# Patient Record
Sex: MALE | Race: BLACK OR AFRICAN AMERICAN | NOT HISPANIC OR LATINO | URBAN - METROPOLITAN AREA
[De-identification: names, ages, dates, MRNs, and addresses within clinical notes are randomized per-mention and may not be internally consistent; named-entity substitution may affect disease eponyms.]

---

## 2020-07-25 ENCOUNTER — TELEPHONE ENCOUNTER (OUTPATIENT)
Dept: URBAN - METROPOLITAN AREA CLINIC 13 | Facility: CLINIC | Age: 80
End: 2020-07-25

## 2020-07-25 RX ORDER — OXYCODONE HYDROCHLORIDE AND ACETAMINOPHEN 10; 325 MG/1; MG/1
TAKE 1 TABLET EVERY 8 HOURS AS NEEDED FOR PAIN TABLET ORAL
Refills: 0 | OUTPATIENT
Start: 2017-08-15 | End: 2018-04-23

## 2020-07-25 RX ORDER — DEXTROSE 4 G
TAKE 1 TABLET DAILY TABLET,CHEWABLE ORAL
Refills: 0 | OUTPATIENT
Start: 2017-08-15 | End: 2018-04-23

## 2020-07-25 RX ORDER — MORPHINE SULFATE 15 MG
TAKE 1 TABLET TWICE DAILY TABLET ORAL
Refills: 0 | OUTPATIENT
Start: 2017-08-15 | End: 2018-04-23

## 2020-07-25 RX ORDER — ALBUTEROL SULFATE 90 UG/1
INHALE 1 TO 2 PUFFS EVERY 4 TO 6 HOURS AS NEEDED AEROSOL, METERED RESPIRATORY (INHALATION)
Refills: 0 | OUTPATIENT
Start: 2017-08-15 | End: 2018-04-23

## 2020-07-25 RX ORDER — BUTALBITAL, ACETAMINOPHEN, CAFFEINE, AND CODEINE PHOSPHATE 30; 50; 40; 325 MG/1; MG/1; MG/1; MG/1
TAKE 1 CAPSULE TWICE DAILY CAPSULE ORAL
Refills: 0 | OUTPATIENT
Start: 2017-08-15 | End: 2018-04-23

## 2020-07-25 RX ORDER — POLYETHYLENE GLYCOL 3350, SODIUM CHLORIDE, SODIUM BICARBONATE AND POTASSIUM CHLORIDE WITH LEMON FLAVOR 420; 11.2; 5.72; 1.48 G/4L; G/4L; G/4L; G/4L
TAKE 1/2 GALLON AT 5:00 PM DAY BEFORE PROCEDURE, TAKE SECOND 1/2 OF GALLON 6 HRS PRIOR TO PROCEDURE POWDER, FOR SOLUTION ORAL
Qty: 1 | Refills: 0 | OUTPATIENT
Start: 2018-04-23 | End: 2018-04-30

## 2020-07-25 RX ORDER — HYDROCHLOROTHIAZIDE 25 MG/1
TAKE 1 TABLET DAILY TABLET ORAL
Refills: 0 | OUTPATIENT
Start: 2017-08-15 | End: 2018-04-23

## 2020-07-25 RX ORDER — FLUTICASONE FUROATE AND VILANTEROL TRIFENATATE 200; 25 UG/1; UG/1
INHALE 1 PUFFS DAILY POWDER RESPIRATORY (INHALATION)
Refills: 0 | OUTPATIENT
Start: 2017-08-15 | End: 2018-04-23

## 2020-07-25 RX ORDER — POLYETHYLENE GLYCOL 3350, SODIUM SULFATE, SODIUM CHLORIDE, POTASSIUM CHLORIDE, ASCORBIC ACID, SODIUM ASCORBATE 7.5-2.691G
DRINK 1 LITER OF SOLUTION AT 5:00 PM THE DAY BEFORE PROCEDURE; DRINK 1 LITER OF SOLUTION 6 HOURS PRIOR TO PROCEDURE KIT ORAL
Qty: 2000 | Refills: 0 | OUTPATIENT
Start: 2019-03-01 | End: 2019-04-16

## 2020-07-25 RX ORDER — METFORMIN HYDROCHLORIDE 500 MG/1
TAKE 1 TABLET EVERY 12 HOURS WITH FOOD TABLET, COATED ORAL
Qty: 60 | Refills: 0 | OUTPATIENT
Start: 2018-10-22 | End: 2020-02-06

## 2020-07-25 RX ORDER — CYCLOBENZAPRINE HYDROCHLORIDE 15 MG/1
TAKE 1 CAPSULE ONCE DAILY CAPSULE, EXTENDED RELEASE ORAL
Refills: 0 | OUTPATIENT
Start: 2017-08-15 | End: 2018-04-23

## 2020-07-25 RX ORDER — POLYETHYLENE GLYCOL 3350, SODIUM CHLORIDE, SODIUM BICARBONATE AND POTASSIUM CHLORIDE WITH LEMON FLAVOR 420; 11.2; 5.72; 1.48 G/4L; G/4L; G/4L; G/4L
TAKE 1/2 GALLON AT 5:00 PM DAY BEFORE PROCEDURE, TAKE SECOND 1/2 OF GALLON 6 HRS PRIOR TO PROCEDURE POWDER, FOR SOLUTION ORAL
Qty: 1 | Refills: 0 | OUTPATIENT
Start: 2017-08-15 | End: 2017-08-25

## 2020-07-25 RX ORDER — ATENOLOL 100 MG/1
TAKE 1 TABLET DAILY TABLET ORAL
Refills: 0 | OUTPATIENT
Start: 2017-08-15 | End: 2018-04-23

## 2020-07-25 RX ORDER — FUROSEMIDE 20 MG/1
TAKE 1 TABLET TWICE DAILY TABLET ORAL
Refills: 0 | OUTPATIENT
Start: 2017-08-15 | End: 2018-04-23

## 2020-07-26 ENCOUNTER — TELEPHONE ENCOUNTER (OUTPATIENT)
Dept: URBAN - METROPOLITAN AREA CLINIC 13 | Facility: CLINIC | Age: 80
End: 2020-07-26

## 2020-07-26 RX ORDER — DONEPEZIL HYDROCHLORIDE 10 MG/1
TABLET, FILM COATED ORAL
Qty: 30 | Refills: 0 | Status: ACTIVE | COMMUNITY
Start: 2018-11-09

## 2020-07-26 RX ORDER — LATANOPROST/PF 0.005 %
DROPS OPHTHALMIC (EYE)
Qty: 8 | Refills: 0 | Status: ACTIVE | COMMUNITY
Start: 2018-09-13

## 2020-07-26 RX ORDER — DONEPEZIL HYDROCHLORIDE 10 MG/1
TAKE 1 TABLET DAILY WITH FOOD TABLET, FILM COATED ORAL
Refills: 0 | Status: ACTIVE | COMMUNITY
Start: 2019-02-11

## 2020-07-26 RX ORDER — CIPROFLOXACIN HYDROCHLORIDE 500 MG/1
TABLET, FILM COATED ORAL
Qty: 20 | Refills: 0 | Status: ACTIVE | COMMUNITY
Start: 2019-03-08

## 2020-07-26 RX ORDER — DORZOLAMIDE/TIMOLOL/PF 2 %-0.5 %
DROPS OPHTHALMIC (EYE)
Qty: 10 | Refills: 0 | Status: ACTIVE | COMMUNITY
Start: 2019-10-15

## 2020-07-26 RX ORDER — DICLOFENAC SODIUM 20 MG/G
SOLUTION TOPICAL
Qty: 112 | Refills: 0 | Status: ACTIVE | COMMUNITY
Start: 2018-01-12

## 2020-07-26 RX ORDER — ROSUVASTATIN CALCIUM 10 MG
TAKE 1 TABLET DAILY TABLET ORAL
Refills: 0 | Status: ACTIVE | COMMUNITY

## 2020-07-26 RX ORDER — DEXTROSE 4 G
TAKE 1 TABLET AT BEDTIME TABLET,CHEWABLE ORAL
Refills: 0 | Status: ACTIVE | COMMUNITY

## 2020-07-26 RX ORDER — CHLORHEXIDINE GLUCONATE 4 %
TAKE 1 TABLET DAILY AS DIRECTED LIQUID (ML) TOPICAL
Refills: 0 | Status: ACTIVE | COMMUNITY

## 2020-07-26 RX ORDER — DORZOLAMIDE HCL/PF 2 %
DROPS OPHTHALMIC (EYE)
Qty: 10 | Refills: 0 | Status: ACTIVE | COMMUNITY
Start: 2018-09-13

## 2020-07-26 RX ORDER — LATANOPROST/PF 0.005 %
INSTILL 1 DROP  INTO AFFECTED EYE(S) ONCE DAILY AS DIRECTED DROPS OPHTHALMIC (EYE)
Refills: 0 | Status: ACTIVE | COMMUNITY
Start: 2019-02-25

## 2020-07-26 RX ORDER — LATANOPROST/PF 0.005 %
DROPS OPHTHALMIC (EYE)
Qty: 8 | Refills: 0 | Status: ACTIVE | COMMUNITY
Start: 2018-05-18

## 2020-07-26 RX ORDER — HYDROCODONE BITARTRATE AND ACETAMINOPHEN 5; 325 MG/1; MG/1
TABLET ORAL
Qty: 30 | Refills: 0 | Status: ACTIVE | COMMUNITY
Start: 2018-02-19

## 2020-07-26 RX ORDER — MEGESTROL ACETATE 20 MG/1
TABLET ORAL
Qty: 20 | Refills: 0 | Status: ACTIVE | COMMUNITY
Start: 2018-03-01

## 2020-07-26 RX ORDER — BLOOD SUGAR DIAGNOSTIC
STRIP MISCELLANEOUS
Qty: 100 | Refills: 0 | Status: ACTIVE | COMMUNITY
Start: 2018-03-29

## 2020-07-26 RX ORDER — METFORMIN HYDROCHLORIDE 500 MG/1
TABLET, COATED ORAL
Qty: 90 | Refills: 0 | Status: ACTIVE | COMMUNITY
Start: 2018-10-22

## 2020-07-26 RX ORDER — MEMANTINE HYDROCHLORIDE 5 MG/1
TABLET ORAL
Qty: 60 | Refills: 0 | Status: ACTIVE | COMMUNITY
Start: 2018-04-27

## 2020-07-26 RX ORDER — DONEPEZIL HYDROCHLORIDE 10 MG/1
TABLET, FILM COATED ORAL
Qty: 30 | Refills: 0 | Status: ACTIVE | COMMUNITY
Start: 2018-12-22

## 2020-07-26 RX ORDER — DICLOFENAC EPOLAMINE 0.01 G/1
SYSTEM TOPICAL
Qty: 60 | Refills: 0 | Status: ACTIVE | COMMUNITY
Start: 2018-10-23

## 2020-07-26 RX ORDER — METFORMIN HYDROCHLORIDE 500 MG/1
TABLET, COATED ORAL
Qty: 90 | Refills: 0 | Status: ACTIVE | COMMUNITY
Start: 2018-01-28

## 2020-07-26 RX ORDER — DORZOLAMIDE HCL/PF 2 %
DROPS OPHTHALMIC (EYE)
Qty: 20 | Refills: 0 | Status: ACTIVE | COMMUNITY
Start: 2018-05-21

## 2020-07-26 RX ORDER — CYPROHEPTADINE HYDROCHLORIDE 4 MG/1
TABLET ORAL
Qty: 30 | Refills: 0 | Status: ACTIVE | COMMUNITY
Start: 2018-03-14

## 2020-07-26 RX ORDER — DONEPEZIL HYDROCHLORIDE 5 MG/1
TABLET, FILM COATED ORAL
Qty: 30 | Refills: 0 | Status: ACTIVE | COMMUNITY
Start: 2018-10-27

## 2020-07-26 RX ORDER — LISINOPRIL 5 MG/1
TABLET ORAL
Qty: 90 | Refills: 0 | Status: ACTIVE | COMMUNITY
Start: 2018-03-04

## 2020-07-26 RX ORDER — FLUOXETINE HCL 20 MG
TAKE 1 CAPSULE DAILY CAPSULE ORAL
Refills: 0 | Status: ACTIVE | COMMUNITY

## 2020-07-26 RX ORDER — VARENICLINE TARTRATE 1 MG/1
TABLET, FILM COATED ORAL
Qty: 30 | Refills: 0 | Status: ACTIVE | COMMUNITY
Start: 2018-01-29

## 2020-07-26 RX ORDER — NALOXONE HYDROCHLORIDE 4 MG/.1ML
SPRAY NASAL
Qty: 2 | Refills: 0 | Status: ACTIVE | COMMUNITY
Start: 2018-09-21

## 2020-07-26 RX ORDER — MEGESTROL ACETATE 40 MG/1
TABLET ORAL
Qty: 30 | Refills: 0 | Status: ACTIVE | COMMUNITY
Start: 2018-03-19

## 2020-07-26 RX ORDER — MEMANTINE HYDROCHLORIDE 10 MG/1
TAKE 1 TABLET DAILY TABLET ORAL
Refills: 0 | Status: ACTIVE | COMMUNITY

## 2021-11-02 ENCOUNTER — TELEPHONE ENCOUNTER (OUTPATIENT)
Dept: URBAN - METROPOLITAN AREA CLINIC 113 | Facility: CLINIC | Age: 81
End: 2021-11-02

## 2021-11-04 ENCOUNTER — DASHBOARD ENCOUNTERS (OUTPATIENT)
Age: 81
End: 2021-11-04

## 2021-11-04 ENCOUNTER — OFFICE VISIT (OUTPATIENT)
Dept: URBAN - METROPOLITAN AREA CLINIC 113 | Facility: CLINIC | Age: 81
End: 2021-11-04
Payer: MEDICARE

## 2021-11-04 ENCOUNTER — WEB ENCOUNTER (OUTPATIENT)
Dept: URBAN - METROPOLITAN AREA CLINIC 113 | Facility: CLINIC | Age: 81
End: 2021-11-04

## 2021-11-04 VITALS
TEMPERATURE: 97.1 F | RESPIRATION RATE: 20 BRPM | HEART RATE: 72 BPM | SYSTOLIC BLOOD PRESSURE: 149 MMHG | BODY MASS INDEX: 29.18 KG/M2 | WEIGHT: 197 LBS | HEIGHT: 69 IN | DIASTOLIC BLOOD PRESSURE: 79 MMHG

## 2021-11-04 DIAGNOSIS — Z85.038 HISTORY OF COLON CANCER: ICD-10-CM

## 2021-11-04 PROCEDURE — 99213 OFFICE O/P EST LOW 20 MIN: CPT | Performed by: NURSE PRACTITIONER

## 2021-11-04 RX ORDER — MEGESTROL ACETATE 40 MG/1
TABLET ORAL
Qty: 30 | Refills: 0 | Status: ON HOLD | COMMUNITY
Start: 2018-03-19

## 2021-11-04 RX ORDER — MEMANTINE HYDROCHLORIDE 5 MG/1
TABLET ORAL
Qty: 60 | Refills: 0 | Status: ON HOLD | COMMUNITY
Start: 2018-04-27

## 2021-11-04 RX ORDER — DICLOFENAC EPOLAMINE 0.01 G/1
SYSTEM TOPICAL
Qty: 60 | Refills: 0 | Status: ON HOLD | COMMUNITY
Start: 2018-10-23

## 2021-11-04 RX ORDER — DICLOFENAC SODIUM 20 MG/G
SOLUTION TOPICAL
Qty: 112 | Refills: 0 | Status: ON HOLD | COMMUNITY
Start: 2018-01-12

## 2021-11-04 RX ORDER — BLOOD SUGAR DIAGNOSTIC
STRIP MISCELLANEOUS
Qty: 100 | Refills: 0 | Status: ON HOLD | COMMUNITY
Start: 2018-03-29

## 2021-11-04 RX ORDER — SODIUM, POTASSIUM,MAG SULFATES 17.5-3.13G
354 ML SOLUTION, RECONSTITUTED, ORAL ORAL ONCE
Qty: 354 MILLILITER | Refills: 0 | OUTPATIENT
Start: 2021-11-04 | End: 2021-11-05

## 2021-11-04 RX ORDER — VARENICLINE TARTRATE 1 MG/1
TABLET, FILM COATED ORAL
Qty: 30 | Refills: 0 | Status: ON HOLD | COMMUNITY
Start: 2018-01-29

## 2021-11-04 RX ORDER — ASPIRIN 81 MG/1
1 TABLET TABLET, COATED ORAL ONCE A DAY
Status: ACTIVE | COMMUNITY

## 2021-11-04 RX ORDER — MORPHINE SULFATE 15 MG/1
1 TABLET AS NEEDED TABLET ORAL
Status: ACTIVE | COMMUNITY

## 2021-11-04 RX ORDER — LISINOPRIL 5 MG/1
TABLET ORAL
Qty: 90 | Refills: 0 | Status: ON HOLD | COMMUNITY
Start: 2018-03-04

## 2021-11-04 RX ORDER — BUTALBITAL, ACETAMINOPHEN, AND CAFFEINE 50; 300; 40 MG/1; MG/1; MG/1
1 CAPSULE AS NEEDED CAPSULE ORAL
Status: ACTIVE | COMMUNITY

## 2021-11-04 RX ORDER — METFORMIN HYDROCHLORIDE 500 MG/1
TABLET, COATED ORAL
Qty: 90 | Refills: 0 | Status: ON HOLD | COMMUNITY
Start: 2018-01-28

## 2021-11-04 RX ORDER — HYDROCODONE BITARTRATE AND ACETAMINOPHEN 5; 325 MG/1; MG/1
TABLET ORAL
Qty: 30 | Refills: 0 | Status: ON HOLD | COMMUNITY
Start: 2018-02-19

## 2021-11-04 RX ORDER — DONEPEZIL HYDROCHLORIDE 10 MG/1
1 TABLET AT BEDTIME TABLET, FILM COATED ORAL ONCE A DAY
Refills: 0 | Status: ACTIVE | COMMUNITY
Start: 2018-10-27

## 2021-11-04 RX ORDER — DORZOLAMIDE HCL/PF 2 %
DROPS OPHTHALMIC (EYE)
Qty: 20 | Refills: 0 | Status: ON HOLD | COMMUNITY
Start: 2018-05-21

## 2021-11-04 RX ORDER — CYPROHEPTADINE HYDROCHLORIDE 4 MG/1
TABLET ORAL
Qty: 30 | Refills: 0 | Status: ON HOLD | COMMUNITY
Start: 2018-03-14

## 2021-11-04 RX ORDER — ROSUVASTATIN CALCIUM 10 MG
TAKE 1 TABLET DAILY TABLET ORAL
Refills: 0 | Status: ACTIVE | COMMUNITY

## 2021-11-04 RX ORDER — NALOXONE HYDROCHLORIDE 4 MG/.1ML
SPRAY NASAL
Qty: 2 | Refills: 0 | Status: ON HOLD | COMMUNITY
Start: 2018-09-21

## 2021-11-04 RX ORDER — MEGESTROL ACETATE 20 MG/1
TABLET ORAL
Qty: 20 | Refills: 0 | Status: ON HOLD | COMMUNITY
Start: 2018-03-01

## 2021-11-04 RX ORDER — CHLORHEXIDINE GLUCONATE 4 %
TAKE 1 TABLET DAILY AS DIRECTED LIQUID (ML) TOPICAL
Refills: 0 | Status: ACTIVE | COMMUNITY

## 2021-11-04 RX ORDER — DONEPEZIL HYDROCHLORIDE 10 MG/1
TABLET, FILM COATED ORAL
Qty: 30 | Refills: 0 | Status: ON HOLD | COMMUNITY
Start: 2018-11-09

## 2021-11-04 RX ORDER — DORZOLAMIDE/TIMOLOL/PF 2 %-0.5 %
DROPS OPHTHALMIC (EYE)
Qty: 10 | Refills: 0 | Status: ON HOLD | COMMUNITY
Start: 2019-10-15

## 2021-11-04 RX ORDER — MEMANTINE HYDROCHLORIDE 10 MG/1
TAKE 1 TABLET DAILY TABLET ORAL TWICE A DAY
Refills: 0 | Status: ACTIVE | COMMUNITY

## 2021-11-04 RX ORDER — LATANOPROST/PF 0.005 %
DROPS OPHTHALMIC (EYE)
Qty: 8 | Refills: 0 | Status: ON HOLD | COMMUNITY
Start: 2018-05-18

## 2021-11-04 RX ORDER — DEXTROSE 4 G
TAKE 1 TABLET AT BEDTIME TABLET,CHEWABLE ORAL
Refills: 0 | Status: ACTIVE | COMMUNITY

## 2021-11-04 RX ORDER — CIPROFLOXACIN HYDROCHLORIDE 500 MG/1
TABLET, FILM COATED ORAL
Qty: 20 | Refills: 0 | Status: ON HOLD | COMMUNITY
Start: 2019-03-08

## 2021-11-04 RX ORDER — FLUOXETINE HCL 20 MG
TAKE 1 CAPSULE DAILY CAPSULE ORAL
Refills: 0 | Status: ON HOLD | COMMUNITY

## 2021-11-04 RX ORDER — ATENOLOL 100 MG/1
1 TABLET TABLET ORAL ONCE A DAY
Status: ACTIVE | COMMUNITY

## 2021-11-04 NOTE — HPI-TODAY'S VISIT:
Mr. Shah is a 80-year-old male with a history of colon cancer s/p resection, presenting to discuss colonoscopy.  His last colonoscopy in 2019 was negative for recurrence. Repeat colonoscopy was recommended in April 2021, with plan for annual labs (CBC, CMP, CEA) with his PCP. He is asymptomatic from a GI standpoint, denying abdominal pain, nausea, vomiting, change in bowel habits, or blood per rectum.

## 2021-11-04 NOTE — HPI-OTHER HISTORIES
Screening Colonoscopy 8/25/17 which revealed a 15 mm polyp in the proximal transverse colon which did not lift suggesting submucosal invasion, polypectomy was not attempted and the area was tattooed. There were 3 other sessile polyps ranging in size from 2-4 mm resected from the transverse colon, ascending colon, many diverticula throughout the entire colon, normal terminal ileum, medium size internal hemorrhoids and otherwise normal colon. Biopsies from the transverse colon polyp were tubulovillous adenoma and the other polyps were tubular adenomas. He was referred to [facility]. Repeat colonoscopy by Dr. Dockery November 14 revealed a 15 mm sessile polyp in the distal ascending colon status post mucosal resection, the polyp did not lift well so piecemeal resection was performed and a large portion was ablated with argon plasma coagulation, 2 tattoos were placed distal to the polyp, 2 polyps measuring 3 mm were found in the cecum which were removed with cold biopsy, 6 mm ileocecal valve polyp was removed with a cold snare and diverticulosis was also present. Pathology from the EMR revealed tubular adenoma with high-grade dysplasia suspicious for invasion, the other polyps were tubular adenomas. He was referred to surgery and underwent [place] [facility] [place] Pathology from the hemicolectomy revealed 10 benign lymph nodes and no obvious cancer or any notable ascending colon polyp or tattoo site. Repeat colonoscopy in 4/16/19 revealed a normal ileocolonic anastomosis in the proximal transverse colon, a tattoo was seen in the mid transverse colon with no evidence of residual cancer or polyp, many diverticula in the sigmoid and descending colon, medium sized internal hemorrhoids and otherwise normal colon.

## 2021-11-05 ENCOUNTER — TELEPHONE ENCOUNTER (OUTPATIENT)
Dept: URBAN - METROPOLITAN AREA CLINIC 23 | Facility: CLINIC | Age: 81
End: 2021-11-05

## 2021-11-08 PROBLEM — 429699009 HISTORY OF MALIGNANT NEOPLASM OF COLON: Status: ACTIVE | Noted: 2021-11-04

## 2021-11-11 ENCOUNTER — CLAIMS CREATED FROM THE CLAIM WINDOW (OUTPATIENT)
Dept: URBAN - METROPOLITAN AREA CLINIC 4 | Facility: CLINIC | Age: 81
End: 2021-11-11
Payer: MEDICARE

## 2021-11-11 ENCOUNTER — OFFICE VISIT (OUTPATIENT)
Dept: URBAN - METROPOLITAN AREA SURGERY CENTER 25 | Facility: SURGERY CENTER | Age: 81
End: 2021-11-11
Payer: MEDICARE

## 2021-11-11 DIAGNOSIS — Z85.038 H/O COLON CANCER, STAGE I: ICD-10-CM

## 2021-11-11 DIAGNOSIS — D12.3 ADENOMA OF TRANSVERSE COLON: ICD-10-CM

## 2021-11-11 DIAGNOSIS — D12.3 BENIGN NEOPLASM OF TRANSVERSE COLON: ICD-10-CM

## 2021-11-11 LAB
A/G RATIO: 1.1
ALBUMIN: 4
ALKALINE PHOSPHATASE: 96
ALT (SGPT): 18
AST (SGOT): 26
BASO (ABSOLUTE): 0
BASOS: 1
BILIRUBIN, TOTAL: 0.7
BUN/CREATININE RATIO: 17
BUN: 19
CALCIUM: 9.3
CARBON DIOXIDE, TOTAL: 21
CEA: 3.3
CHLORIDE: 106
CREATININE: 1.15
EGFR IF AFRICN AM: 70
EGFR IF NONAFRICN AM: 61
EOS (ABSOLUTE): 0.1
EOS: 2
GLOBULIN, TOTAL: 3.5
GLUCOSE: 134
HEMATOCRIT: 37.3
HEMATOLOGY COMMENTS:: (no result)
HEMOGLOBIN: 12.1
IMMATURE CELLS: (no result)
IMMATURE GRANS (ABS): 0
IMMATURE GRANULOCYTES: 0
LYMPHS (ABSOLUTE): 2.6
LYMPHS: 40
MCH: 31.9
MCHC: 32.4
MCV: 98
MONOCYTES(ABSOLUTE): 1
MONOCYTES: 17
NEUTROPHILS (ABSOLUTE): 2.5
NEUTROPHILS: 40
NRBC: (no result)
PLATELETS: 219
POTASSIUM: 4.4
PROTEIN, TOTAL: 7.5
RBC: 3.79
RDW: 12.3
SODIUM: 141
WBC: 6.3

## 2021-11-11 PROCEDURE — 45385 COLONOSCOPY W/LESION REMOVAL: CPT | Performed by: INTERNAL MEDICINE

## 2021-11-11 PROCEDURE — 88305 TISSUE EXAM BY PATHOLOGIST: CPT | Performed by: PATHOLOGY

## 2021-11-11 PROCEDURE — G8907 PT DOC NO EVENTS ON DISCHARG: HCPCS | Performed by: INTERNAL MEDICINE

## 2021-11-11 RX ORDER — HYDROCODONE BITARTRATE AND ACETAMINOPHEN 5; 325 MG/1; MG/1
TABLET ORAL
Qty: 30 | Refills: 0 | Status: ON HOLD | COMMUNITY
Start: 2018-02-19

## 2021-11-11 RX ORDER — METFORMIN HYDROCHLORIDE 500 MG/1
TABLET, COATED ORAL
Qty: 90 | Refills: 0 | Status: ON HOLD | COMMUNITY
Start: 2018-01-28

## 2021-11-11 RX ORDER — ROSUVASTATIN CALCIUM 10 MG
TAKE 1 TABLET DAILY TABLET ORAL
Refills: 0 | Status: ACTIVE | COMMUNITY

## 2021-11-11 RX ORDER — BUTALBITAL, ACETAMINOPHEN, AND CAFFEINE 50; 300; 40 MG/1; MG/1; MG/1
1 CAPSULE AS NEEDED CAPSULE ORAL
Status: ACTIVE | COMMUNITY

## 2021-11-11 RX ORDER — LATANOPROST/PF 0.005 %
DROPS OPHTHALMIC (EYE)
Qty: 8 | Refills: 0 | Status: ON HOLD | COMMUNITY
Start: 2018-05-18

## 2021-11-11 RX ORDER — DORZOLAMIDE HCL/PF 2 %
DROPS OPHTHALMIC (EYE)
Qty: 20 | Refills: 0 | Status: ON HOLD | COMMUNITY
Start: 2018-05-21

## 2021-11-11 RX ORDER — MEMANTINE HYDROCHLORIDE 5 MG/1
TABLET ORAL
Qty: 60 | Refills: 0 | Status: ON HOLD | COMMUNITY
Start: 2018-04-27

## 2021-11-11 RX ORDER — ATENOLOL 100 MG/1
1 TABLET TABLET ORAL ONCE A DAY
Status: ACTIVE | COMMUNITY

## 2021-11-11 RX ORDER — DICLOFENAC EPOLAMINE 0.01 G/1
SYSTEM TOPICAL
Qty: 60 | Refills: 0 | Status: ON HOLD | COMMUNITY
Start: 2018-10-23

## 2021-11-11 RX ORDER — DONEPEZIL HYDROCHLORIDE 10 MG/1
1 TABLET AT BEDTIME TABLET, FILM COATED ORAL ONCE A DAY
Refills: 0 | Status: ACTIVE | COMMUNITY
Start: 2018-10-27

## 2021-11-11 RX ORDER — MORPHINE SULFATE 15 MG/1
1 TABLET AS NEEDED TABLET ORAL
Status: ACTIVE | COMMUNITY

## 2021-11-11 RX ORDER — NALOXONE HYDROCHLORIDE 4 MG/.1ML
SPRAY NASAL
Qty: 2 | Refills: 0 | Status: ON HOLD | COMMUNITY
Start: 2018-09-21

## 2021-11-11 RX ORDER — FLUOXETINE HCL 20 MG
TAKE 1 CAPSULE DAILY CAPSULE ORAL
Refills: 0 | Status: ON HOLD | COMMUNITY

## 2021-11-11 RX ORDER — CHLORHEXIDINE GLUCONATE 4 %
TAKE 1 TABLET DAILY AS DIRECTED LIQUID (ML) TOPICAL
Refills: 0 | Status: ACTIVE | COMMUNITY

## 2021-11-11 RX ORDER — BLOOD SUGAR DIAGNOSTIC
STRIP MISCELLANEOUS
Qty: 100 | Refills: 0 | Status: ON HOLD | COMMUNITY
Start: 2018-03-29

## 2021-11-11 RX ORDER — CYPROHEPTADINE HYDROCHLORIDE 4 MG/1
TABLET ORAL
Qty: 30 | Refills: 0 | Status: ON HOLD | COMMUNITY
Start: 2018-03-14

## 2021-11-11 RX ORDER — CIPROFLOXACIN HYDROCHLORIDE 500 MG/1
TABLET, FILM COATED ORAL
Qty: 20 | Refills: 0 | Status: ON HOLD | COMMUNITY
Start: 2019-03-08

## 2021-11-11 RX ORDER — ASPIRIN 81 MG/1
1 TABLET TABLET, COATED ORAL ONCE A DAY
Status: ACTIVE | COMMUNITY

## 2021-11-11 RX ORDER — DICLOFENAC SODIUM 20 MG/G
SOLUTION TOPICAL
Qty: 112 | Refills: 0 | Status: ON HOLD | COMMUNITY
Start: 2018-01-12

## 2021-11-11 RX ORDER — MEGESTROL ACETATE 20 MG/1
TABLET ORAL
Qty: 20 | Refills: 0 | Status: ON HOLD | COMMUNITY
Start: 2018-03-01

## 2021-11-11 RX ORDER — DORZOLAMIDE/TIMOLOL/PF 2 %-0.5 %
DROPS OPHTHALMIC (EYE)
Qty: 10 | Refills: 0 | Status: ON HOLD | COMMUNITY
Start: 2019-10-15

## 2021-11-11 RX ORDER — VARENICLINE TARTRATE 1 MG/1
TABLET, FILM COATED ORAL
Qty: 30 | Refills: 0 | Status: ON HOLD | COMMUNITY
Start: 2018-01-29

## 2021-11-11 RX ORDER — MEMANTINE HYDROCHLORIDE 10 MG/1
TAKE 1 TABLET DAILY TABLET ORAL TWICE A DAY
Refills: 0 | Status: ACTIVE | COMMUNITY

## 2021-11-11 RX ORDER — LISINOPRIL 5 MG/1
TABLET ORAL
Qty: 90 | Refills: 0 | Status: ON HOLD | COMMUNITY
Start: 2018-03-04

## 2021-11-11 RX ORDER — MEGESTROL ACETATE 40 MG/1
TABLET ORAL
Qty: 30 | Refills: 0 | Status: ON HOLD | COMMUNITY
Start: 2018-03-19

## 2021-11-11 RX ORDER — DEXTROSE 4 G
TAKE 1 TABLET AT BEDTIME TABLET,CHEWABLE ORAL
Refills: 0 | Status: ACTIVE | COMMUNITY

## 2022-01-20 ENCOUNTER — OFFICE VISIT (OUTPATIENT)
Dept: URBAN - METROPOLITAN AREA CLINIC 113 | Facility: CLINIC | Age: 82
End: 2022-01-20